# Patient Record
Sex: FEMALE | Race: WHITE
[De-identification: names, ages, dates, MRNs, and addresses within clinical notes are randomized per-mention and may not be internally consistent; named-entity substitution may affect disease eponyms.]

---

## 2020-05-28 ENCOUNTER — HOSPITAL ENCOUNTER (OUTPATIENT)
Dept: HOSPITAL 84 - D.CATH | Age: 70
Discharge: HOME | End: 2020-05-28
Attending: INTERNAL MEDICINE
Payer: MEDICARE

## 2020-05-28 VITALS — BODY MASS INDEX: 20.9 KG/M2 | WEIGHT: 130.07 LBS | HEIGHT: 66 IN

## 2020-05-28 VITALS — DIASTOLIC BLOOD PRESSURE: 69 MMHG | SYSTOLIC BLOOD PRESSURE: 156 MMHG

## 2020-05-28 DIAGNOSIS — R00.2: ICD-10-CM

## 2020-05-28 DIAGNOSIS — I25.119: Primary | ICD-10-CM

## 2020-05-28 LAB
ALT SERPL-CCNC: 20 U/L (ref 10–68)
ANION GAP SERPL CALC-SCNC: 9.4 MMOL/L (ref 8–16)
BASOPHILS NFR BLD AUTO: 0.7 % (ref 0–2)
BUN SERPL-MCNC: 12 MG/DL (ref 7–18)
CALCIUM SERPL-MCNC: 9.5 MG/DL (ref 8.5–10.1)
CHLORIDE SERPL-SCNC: 105 MMOL/L (ref 98–107)
CHOLEST/HDLC SERPL: 2.4 RATIO (ref 2.3–4.1)
CO2 SERPL-SCNC: 28.2 MMOL/L (ref 21–32)
CREAT SERPL-MCNC: 0.8 MG/DL (ref 0.6–1.3)
EOSINOPHIL NFR BLD: 2.2 % (ref 0–7)
ERYTHROCYTE [DISTWIDTH] IN BLOOD BY AUTOMATED COUNT: 12.5 % (ref 11.5–14.5)
GLUCOSE SERPL-MCNC: 105 MG/DL (ref 74–106)
HCT VFR BLD CALC: 39.6 % (ref 36–48)
HDLC SERPL-MCNC: 81 MG/DL (ref 32–96)
HGB BLD-MCNC: 13.1 G/DL (ref 12–16)
IMM GRANULOCYTES NFR BLD: 0.3 % (ref 0–5)
LDL-HDL RATIO: 1.1 RATIO (ref 1.5–3.5)
LDLC SERPL-MCNC: 87 MG/DL (ref 0–100)
LYMPHOCYTES NFR BLD AUTO: 30.9 % (ref 15–50)
MCH RBC QN AUTO: 32.3 PG (ref 26–34)
MCHC RBC AUTO-ENTMCNC: 33.1 G/DL (ref 31–37)
MCV RBC: 97.8 FL (ref 80–100)
MONOCYTES NFR BLD: 7.8 % (ref 2–11)
NEUTROPHILS NFR BLD AUTO: 58.1 % (ref 40–80)
OSMOLALITY SERPL CALC.SUM OF ELEC: 277 MOSM/KG (ref 275–300)
PLATELET # BLD: 265 10X3/UL (ref 130–400)
PMV BLD AUTO: 9.9 FL (ref 7.4–10.4)
POTASSIUM SERPL-SCNC: 3.6 MMOL/L (ref 3.5–5.1)
RBC # BLD AUTO: 4.05 10X6/UL (ref 4–5.4)
SODIUM SERPL-SCNC: 139 MMOL/L (ref 136–145)
TRIGL SERPL-MCNC: 117 MG/DL (ref 30–200)
WBC # BLD AUTO: 6.7 10X3/UL (ref 4.8–10.8)

## 2020-05-28 PROCEDURE — 93458 L HRT ARTERY/VENTRICLE ANGIO: CPT

## 2020-05-28 NOTE — NUR
R WRIST SITE C/D/I, NO S/S BLEEDING OR HEMATOMA. R ARM/HAND WARM,
PULSES PALP WITH BRISK CAP REFILL. VSS. ALARMS ON AND C/L IN REACH.

## 2020-05-28 NOTE — NUR
2cc OF AIR REMOVED FROM Z BAND. NO BLEEDING/HEMATOMA NOTED. CALL
LIGHT WITHIN REACH. VSS AT THIS TIME. FRIEND AT BEDSIDE.

## 2020-05-28 NOTE — NUR
R WRIST SITE C/D/I, NO S/S BLEEDING OR HEMATOMA. HAND WARM WITH PALP
PULSES.  VSS. PT DENIES PAIN OR NEEDS. FRIEND REMAINS AT BS.

## 2020-05-28 NOTE — NUR
R WRIST SITE C/D/I, NO S/S BLEEDING OR HEMATOMA. PULSES PALP. VSS.
PT GIVEN COFFEE PER REQUEST. ALARMS ON AND C/L IN REACH.

## 2020-05-28 NOTE — NUR
1cc OF AIR REMOVED FROM Z BAND. NO BLEEDING/HEMATOMA NOTED. CALL
LIGHT WITHIN REACH. FRIEND AT BEDSIDE. VSS.

## 2020-05-28 NOTE — HEMODYNAMI
PATIENT:WILFREDO FIORE                                   MEDICAL RECORD: [id number]
: [birth year]                                            LOCATION:D.CAT          
ACCT# [id number]                                       ADMISSION DATE: 20
 
 
 Generatedon:[id number]:[id number]
Patient name: WILFREDO FIORE Patient #: [id number] Visit #: [id number] SSN: YOB: 1950 Date of
study: 2020
Page: Of
Hemodynamic Procedure Report
****************************
Patient Data
Patient Demographics
Procedure consent was obtained
First Name: WILFREDO         Gender: Female
Last Name: ADEBAYO           : 1950
Patient #: [id number]       Age: 69 year(s)
Visit #: [id number]       Race: 
Accession #:
[id number]
Additional ID: [id number]
Contact details
Address: [address] [place] [address]   Phone: [phone]
State: [place]
City: [place]
Zip code: [address]
Admission
Admission Data
Admission Date: 2020   Admission Time: 11:09
Arrival Date: 2020     Arrival Time: 13:00
Admit Source: Other         Insurance Payor: Medicare
[facility] #: [id number]
Height (in.): 65.75         BSA: 1.66 (m2)
Height (cm.): 167           BMI: 21.16 (kg/m2)
Weight (lbs.): 130.07
Weight (kg.): 59
Lab Results
Lab Result Date: 2020  Lab Result Time: 0:00
Biochemistry
Name         Units    Result                Min      Max
BUN          mg/dl    12       --(-*--)--   7        18
Creatinine   mg/dl    0.8      --(-*--)--   0.6      1.3
eGFR         ml/min   75       *-(----)--   90       120
NONAFRICAN
CBC
Name         Units    Result                Min      Max
Hemoglobin   g/dl     13.1     -*(----)--   13.5     17.5
Procedure
Procedure Types
Cath Procedure
Diagnostic Procedure
C
[facility] w/Coronaries
Sedation Charges
Moderate Sedation up to 15 minutes
PCI Procedure
Coronary Stent
Coronary Stent Initial
 
Hemochron ACT Test
Procedure Description
Procedure Date
Procedure Date: 2020
Procedure Start Time: 13:11
Procedure End Time: 13:32
Procedure Staff
Name                            Function
Fermin Linda MD              Performing Physician
Rose Bowie RT                    Monitor
Na Kelly RT                Scrub
Mary Ellen Villeda RN                  Nurse
Indication
Angina
Procedure Data
Cath Procedure
Fluoroscopy
Diagnostic fluoroscopy      Total fluoroscopy Time: 3.2
time: 3.2 min               min
Diagnostic fluoroscopy      Total fluoroscopy dose: 249
dose: 249 mGy               mGy
Contrast Material
Contrast Material Type                       Amount (ml)
Isovue 300                                   75
Entry Location
Entry     Primary  Successful  Side  Size  Upsize Upsize Entry    Closure     Burns
ccessful  Closure
Location                             (Fr)  1 (Fr) 2 (Fr) Remarks  Device        
          Remarks
Radial                         Right 6 Fr                         Mechanical
artery                               Short                        Compression
Estimated blood loss: 5 ml
Diagnostic catheters
Device Type               Used For           End Catheter
Placement
DIAGNOSTIC [place] 110cm 5  Multi-vessel
Fr catheter (002693)      Angiography
Procedure Complications
No complications
Procedure Medications
Medication           Administration Route Dosage
Oxygen               etCO2 Nasal cannula  2 l/min
Lidocaine 2%         added to field       20
Heparin Flush Bag    added to field       2 bags
(1000units/500ml NS)
0.9% NaCl            I.V.                 100 ml/hr
Radial Cocktail      I.A.                 1 syringe
(Verapamil 2mg/Nitro
400mcg/Heparin
1500units)
Versed               I.V.                 2 mg
Fentanyl             I.V.                 50 mcg
Heparin Bolus        I.V.                 4000 units
Versed               I.V.                 1 mg
Fentanyl             I.V.                 50 mcg
Integrilin (Bolus    I.V.                 5 ml
2mg/ml)
Versed               I.V.                 1 mg
Plavix               P.O.                 600 mg
Hemodynamics
 
Rest
BSA: 1.66 (m2) HGB: 13.1 (g/dl) O2 Consumption: Estimated: 171.26 (ml/min) O2 Co
nsumption indexed:
Estimated:103.17 (ml/min/m) Heart Rate: 100 (bpm)
Pressure Samples
Time     Site     Value (mmHg) Purpose      Heart      Use
Rate(bpm)
13:15    LV       152/39,32    Snapshot     137
13:15    AO       152/135(136) Pullback     106
Gradients
Valve  Time  Site Site 2       Mean    SEP/DFP    Peak To Heart  Use
1                 (mmHg)  (sec/min)  Peak    Rate
(mmHg)  (bpm)
Aortic 13:15 LV   AO                                      106
152/135(136)
Snapshots
Pre Cath      Intra         NCS           Post Cath
Vital Signs
Time     Heart  Resp   SPO2 etCO2   NIBP (mmHg) Rhythm  Pain    Sedation
Rate   (ipm)  (%)  (mmHg)                      Status  Level
(bpm)
13:03:23 88     15     97   32.1    157/75(107) NSR     0 (11)  10(A)
, No
pain
13:07:50 96     16     98   35.1    148/69(107) NSR     0 (11)  10(A)
, No
pain
13:12:12 91     16     100  14.9    133/63(88)  NSR     0 (11)  9(A)
, No
pain
13:16:30 107    14     98   38.1    124/61(85)  NSR     0 (11)  9(A)
, No
pain
13:20:46 111    13     99   38.1    124/63(92)  NSR     0 (11)  9(A)
, No
pain
13:25:08 111    22     100  32.1    137/61(93)  NSR     0 (11)  9(A)
, No
pain
13:29:30 101    15     100  34.4    131/67(89)  NSR     0 (11)  10(A)
, No
pain
Medications
Time     Medication       Route   Dose    Verified Delivered Reason          Not
es    Effectiveness
by       by
13:03:12 Oxygen           etCO2   2 l/min Fermin Hyde    used for
Nasal           [facility] Villeda RN   procedure
cannula         MD
13:03:20 Lidocaine 2%     added   20ml    Fermin Christensen   used for
to      vial    [facility]   procedure
field MD MAYO
13:03:25 Heparin Flush    added   2 bags  Fermin Christensen   used for
Bag              to              [facility]   procedure
(1000units/500ml field MD MAYO
NS)
13:03:34 0.9% NaCl        I.V.    100     Fermin Hyde    Per physician
ml/hr   [facility] Villeda RN, MD
13:08:43 Versed           I.V.    2 mg    Fermin Carreroie    for sedation
 
[facility] Villeda RN, MD
13:08:50 Fentanyl         I.V.    50 mcg  Fermin Carreroie    for sedation
[facility] Villeda RN, MD
13:13:27 Radial Cocktail  I.A.    1       Fermin Christensen   for
(Verapamil               syringe [facility]   vasodilation
2mg/Nitro                        MD MAYO
400mcg/Heparin
1500units)
13:14:58 Versed           I.V.    1 mg    Fermin Carreroie    for sedation
[facility] Villeda RN, MD
13:15:01 Fentanyl         I.V.    50 mcg  Fermin Hyde    for sedation
[facility] Villeda RN, MD
13:19:41 Heparin Bolus    I.V.    4000    Fermin Hyde    for             annabelle
ified
units   [facility] Villeda RN   anticoagulation with dr MD peck
13:22:25 Integrilin       I.V.    5 ml    Fermin Hyde    for             was
xiomara 5
(Bolus 2mg/ml)                   [facility] Villeda RN   anticoagulation ml of
MD                                 vial
13:25:52 Versed           I.V.    1 mg    Fermin Hyde    for sedation
[facility] Villeda RN, MD
13:30:00 Plavix           P.O.    600 mg  Fermin Hyde    for
Maddi  Bell RN   antiplatelet
MD                 therapy
Procedure Log
Time     Note
12:30:04 Mary Ellen Villeda RN sent for patient. Start room use.
12:46:05 Diagnostic Cath Status : Elective
12:46:56 Indication : Angina
12:47:11 Time tracking: Regular hours (M-F 7:00 - 5:00)
12:47:15 Plan of Care:Hemodynamics will remain stable., Cardiac
rhythm will remain stable., Comfort level will be
maintained., Respiratory function will remain
adequate., Patient/ family verbilizes understanding of
procedure., Procedure tolerated without complication.,
Recovers from procedure without complications..
12:49:14 Lab Result : eGFR NONAFRICAN 75 ml/min
12:49:14 Lab Result : Hemoglobin 13.1 g/dl
12:49:14 Lab Result : BUN 12 mg/dl
12:49:14 Lab Result : Creatinine 0.8 mg/dl
12:51:01 Informed consent obtained and on chart
12:52:08 Admit Source: Other
12:52:10 Arrival Date: 2020 1:00:00 PM
12:52:36 Insurance Payor : Medicare
12:52:46 Patient Height : 65.75 inches
12:52:49 Patient Weight : 130.07 lbs
12:52:57 Patient received from Pre/Post Procedure Room to CCL 1
Alert and oriented. Tansferred to table in Supine
position.
12:52:58 Warm blankets applied, and spencer hugger turned on for
patient comfort.
12:52:59 Correct patient and procedure confirmed by team.
12:52:59 ECG and BP/O2 sat monitors applied to patient.
13:02:08 Vital chart was started
 
13:03:12 Oxygen 2 l/min etCO2 Nasal cannula was administered by
Mary Ellen Villeda RN; used for procedure; Verbal order read
back and verified.
13:03:20 Lidocaine 2% 20ml vial added to field was administered
by Fermin Linda MD; used for procedure; Verbal
order read back and verified.
13:03:25 Heparin Flush Bag (1000units/500ml NS) 2 bags added to
field was administered by Fermin Linda MD; used for
procedure; Verbal order read back and verified.
13:03:34 0.9% NaCl 100 ml/hr I.V. was administered by Mary Ellen Villeda RN; Per physician; Verbal order read back and
verified.
13:05:43 Baseline sample Acquired.
13:05:54 Rhythm: sinus tachycardia
13:05:56 Full Disclosure recording started
13:06:01 H&P Date Dictated: 2020 Within 30 days and on
chart., H&P Addendum completed by physician on day of
procedure. (MUST COMPLETE FOR ALL OUTPATIENTS).
13:06:02 Pre-procedure instructions explained to patient.
13:06:03 Pre-op teaching completed and patient verbalized
understanding.
13:06:05 Family in patients room.
13:06:06 Patient NPO since Midnight.
13:06:09 Is the patient allergic to Iodine/contrast media? No.
13:06:10 Was the patient premedicated? Yes
13:06:11 Is patient on blood thinner?No
13:06:12 Patient diabetic? No.
13:06:15 Previous problem with sedation/anesthesia? No ?
13:06:17 Snore? No
13:06:18 Sleep apnea? No
13:06:19 Deviated septum? No
13:06:20 Opens mouth fully? Yes
13:06:21 Sticks out tongue? Yes
13:06:23 Airway obstruction? No ?
13:06:29 Dentures? Yes CRACKED TOOTH
13:06:36 Pre procedure: right dorsailis pedis pulse 2+ Normal;
easily identifiable; not easily obliterated
13:06:39 Pre procedure: left dorsailis pedis pulse 2+ Normal;
easily identifiable; not easily obliterated
13:06:42 Patient pain scale 0/10 ?.
13:06:53 IV patent on arrival in left forearm with 0.9% NaCl at
[facility].
13:07:05 Lab results completed and on chart.
13:07:40 Stress Test: yes; N/A ?
13:07:45 Risk of Mortality: 0.1
13:07:48 Risk of blood transfusion: 0.8
13:07:52 Risk of DREA: 0.4
13:08:21 Right Radial & Right Groin area was prepped with
chlora-prep and draped in sterile fashion
13:08:22 Alarms reviewed by RKarina N.
13:08:22 Sharps counted by scrub and verified by R.N.
13:08:24 Physician arrived
13:08:24 --------ALL STOP TIME OUT------
13:08:25 Final Timeout: patient, procedure, and site verified
with staff and physician. All members of the team are
in agreement.
13:08:26 Right Radial & Right Groin site verified by team.
13:08:30 Fire Safety Assessment: A--An alcohol-based skin
anteseptic being used preoperatively., C--Open oxygen
or nitrous oxide is being used., D--An ESU, laser, or
 
fiber-optic light is being used.
13:08:34 Physical assessment completed. ASA score P 2 - A
patient with mild systemic disease as per Fermin Linda MD.
13:08:43 Versed 2 mg I.V. was administered by Mary Ellen Villeda RN;
for sedation; Verbal order read back and verified.
13:08:44 2) 60-89 Mildly reduced kidney function, and other
findings (as for stage 1) point to kidney disease.
13:08:50 Fentanyl 50 mcg I.V. was administered by Mary Ellen Villeda
RN; for sedation; Verbal order read back and verified.
13:08:53 Maximum allowable contrast dose (3.7 X eGFR X 0.75)203
ml.
13:08:59 Sedation plan: IV Moderate Sedation Medication:Versed,
Fentanyl
13:09:32 Use device set Radial Dx or PCI
13:09:33 ACIST Syringe (82558) opened to sterile field.
13:09:33 Medline Cath Pack ([id number]) opened to sterile field.
13:09:34 Bag Decanter () opened to sterile field.
13:09:34 ACIST Hand Control (50937) opened to sterile field.
13:09:34 ACIST Manifold (98431) opened to sterile field.
13:09:35 Tegaderm 4 x 4 (1626W) opened to sterile field.
13:09:35 MBrace Wrist Support ([id number]) opened to sterile
field.
13:09:38 SHEATH 6FR RAIN (6905256) opened to sterile field.
13:09:38 EMERALD Guide Wire (459-607) opened to sterile field.
13:11:52 Procedure started.
13:11:57 Local anesthetic to right radial artery with Lidocaine
2% by Fermin Linda MD.**INITIAL ACCESS ONLY**
13:12:06 A 6 Fr Short sheath was inserted into the Right Radial
artery
13:13:27 Radial Cocktail (Verapamil 2mg/Nitro 400mcg/Heparin
1500units) 1 syringe I.A. was administered by Fermin Linda MD; for vasodilation; Verbal order read back
and verified.
13:14:39 A DIAGNOSTIC Tiger 110cm 5 Fr catheter (880812) was
advanced over the wire and used for Multi-vessel
Angiography.
13:14:58 Versed 1 mg I.V. was administered by Mary Ellen Villeda RN;
for sedation; Verbal order read back and verified.
13:15:01 Fentanyl 50 mcg I.V. was administered by Mary Ellen Villeda
RN; for sedation; Verbal order read back and verified.
13:15:33 LV hemodynamics recorded.
13:15:35 LV gram done using OROZCO
13:15:53 EF : 55 %
13:16:01 LCA angiography performed.
13:16:08 Injector settings: Ml/sec: 3, Volume: 6,
13:16:26 **ACC** Pre-intervention LATOSHA Flow is 3.
13:16:27 Pre PCI Site: Native mLAD has 80% stenosis.
13:17:11 RCA angiography performed.
13:17:14 Injector settings: Ml/sec: 3, Volume: 6,
13:17:26 **ACC**Dominant side:Right
13:17:26 Catheter removed.
13:17:26 Proceeding to intervention.
13:17:52 INFLATOR Merit BasixCompak (VE4646) opened to sterile
field.
13:17:57 GUIDE 6FR XBLAD 3.5 catheter ([id number]) opened to
sterile field.
13:18:16 WHISPER 300cm guide wire ([id number]) opened to sterile
field.
13:19:40 6 Fr XBLAD 3.5 guide catheter was inserted over the
 
wire
13:19:41 Heparin Bolus 4000 units I.V. was administered by
Mary Ellen Villeda RN; for anticoagulation; verified with dr peck Verbal order read back and verified.
13:19:43 WHISPER wire advanced.
13:22:25 Integrilin (Bolus 2mg/ml) 5 ml I.V. was administered
by Mary Ellen Villeda RN; for anticoagulation; wasted 5 ml of
vial Verbal order read back and verified.
13:23:28 Wire advanced across lesion.
13:25:52 Versed 1 mg I.V. was administered by Mary Ellen Villeda RN;
for sedation; Verbal order read back and verified.
13::43 Place stent Inflation Number: 1 A PATRICK RX 3.0 x 15
stent ([id number]) was prepped and advanced across
the Mid LAD 80. The stent was deployed at 14 MAIRA for
0:10 (min:sec) 0.
13:26:58 Stent catheter was removed intact over wire.
13::59 Wire removed.
13::59 Guide catheter removed.
13:27:32 Sheath removed intact; hemostasis achieved with
Mechanical Compression to the Right Radial artery.
13:27:36 ZEPHYR REGULAR TR BAND (042268) opened to sterile
field.
13:27:39 Procedure ended.(Physican Out)
13:30:00 Plavix 600 mg P.O. was administered by Mary Ellen Villeda RN;
for antiplatelet therapy; Verbal order read back and
verified.
13:30:56 **ACC** Post-intervention LATOSHA Flow is 3.
13:31:07 Fluoroscopy time 03.20 minutes.
13:31:12 Fluoroscopy dose: 249 mGy
13:31:12 Flurop Dose total: 249
13:31:18 Dose Area Product 77460 mGy/cm.
13:31:35 Contrast amount:Isovue 300 75ml.
13:31:38 Maximum allowable dose exceeded? No.
13:31:39 Sharps counted by scrub and verified by R.N.
13:31:41 [place] band inflated with 12cc of air.
13:31:42 Insertion/operative site no bleeding no hematoma.
13:31:47 Post right radial artery:stable
13:31:49 Post Procedure Pulses reassessed and unchanged
13:31:51 Post procedure rhythm: unchanged.
13:31:54 Estimated blood loss: 5 ml
13:31:55 Post procedure instruction explained to
patient.Patient verbalizes understanding.
13:31:56 Patient needs reinforcement of post procedure
teaching.
13:32:34 Procedure type changed to Cath procedure, Diagnostic
procedure, [facility], [facility] w/Coronaries, Sedation Charges,
Moderate Sedation up to 15 minutes, PCI procedure,
Coronary Stent, Coronary Stent Initial, Hemochron ACT
Test
13:32:36 Procedure and supply charges have been captured,
reviewed, submitted and are correct.
13:32:41 Procedure Complication : No complications
13:32:43 Vital chart was stopped
13:32:44 [facility] Findings: MVD- PCI performed (see procedure note)
13:32:46 Operative report dictated upon procedure completion.
13:32:47 See physician's report for complete and final results.
13:32:49 Report given to Pre/Post Procedure Room.
13:32:52 Patient transfered to Pre/Post Procedure Room with
Stretcher.
13:32:54 Procedure ended.
 
13:32:54 Full Disclosure recording stopped
13:33:02 ACT drawn and resulted at out of range high seconds.
(normal therapeutic range 180-240 seconds).
13:33:21 **ACC-PCI Only** Patient was given prescriptions, or
instructed by Fermin Linda MD to start/continue the
following medications upon discharge: Plavix
13:33:22 End room use (Document Last)
Intervention Summary
Intervention Notes
Time     ActionType  Lesion and  Equipment Used Action#  Pressure  Duration
Attributes
13:26:43 Place stent Mid LAD     PATRICK RX 3.0 x  1        14        00:10
15 stent
([id number])
Device Usage
Item Name      Manufacture  Quantity  Catalog       Hospital Part     Current Mi
nimal Lot# /
Number        Charge   Number   Stock   Stock   Serial#
Code
ACIST Syringe  Acist        1         16962         186766   551531   513418  20
(02297)        Medical
Systems Inc
Medline Cath   Medline      1         [id number]     809042   52144    521197  5
Pack
([id number])
Bag Decanter   Microtek     1         S         368123   58849    578118  5
(S)        Medical Inc.
ACIST Hand     Acist        1         79601         915667   695443   884184  5
Control        Medical
(95529)        Systems Inc
ACIST Manifold Acist        1         64077         768972   129999   126768  5
(31404)        Medical
Systems Inc
Tegaderm 4 x 4 3M           1         1626W         580467   864470   309189  5
(1626W)
MBrace Wrist   Advanced     1         140-0250-00   682542   00251    044934  5
Support        Vascular
([id number])    Dynamics
SHEATH 6FR     Cardinal     1         7657345       527214   4508744  624526  5
RAIN (7870174) [facility]
EMERALD Guide  Cardinal     1         502-455       313971   144137   121527  5
Wire (502-455) [facility]
DIAGNOSTIC     Terumo       1                796364   422947   642640  5
Tiger 110cm 5
Fr catheter
(620051)
INFLATOR Merit Merit        1         JB1068        274690   747904   301776  15
BasixCompak    Medical
(HC3921)
GUIDE 6FR      Cardinal     1         81634646      534396   883061   448860  10
XBLAD 3.5      Health
catheter
(24049114)
WHISPER 300cm  Funes       1         [id number]     556044   738718   125951  5
guide wire     Vascular
([id number])
PATRICK RX 3.0 x  Medtronic    1         [id number]  582001   6889558  112379  5 
      [id number]
15 stent
([id number])
 
ZEPHYR REGULAR Cardinal     1         549062        691115   6745279  924551  5
[facility]        Conecta 2
(691652)
Signature Audit [employer]
Stage           Time        Signature      Unsigned
Intra-Procedure 2020   Rose Bowie
1:36:09 PM  RT(R)
Intra-Procedure 2020   Mary Ellen Villeda RN
1:36:33 PM
Intra-Procedure 2020   Fermin Saldana
1:36:49 PM  Ralph MAYO
 
 
 
 
 
 
 
 
 
 
 
 
 
 
 
 
 
 
 
 
 
 
 
 
 
 
 
 
 
 
 
 
 
 
 
 
 
 
 
 
 
 
 
 
[id number] [place] [address]

## 2020-05-28 NOTE — NUR
Z BAND REMOVED AND DSG APPLIED, ARM BOARD IN PLACE. ALL D/C
INSTRUCTIONS REVIEWED WITH PT AND FRIEND WHO IS STAYING WITH HER
TONIGHT.

## 2020-05-28 NOTE — NUR
PT REC'D TO ROOM 5 VIA STRETCHER FROM CATH LAB. FRIEND AT BS.
MONITORS ESTAB.  SEE RN ASSESSMENT.  ALARMS ON AND C/L IN REACH.

## 2020-05-28 NOTE — NUR
R WRIST SITE C/D/I, NO S/S BLEEDING. PIV D/C'D INTACT, DSG APPLIED.
PT UP TO GET DRESSED WITH ASSISTANCE, THEN TO BR INDEPENDENTLY.

## 2020-05-29 NOTE — OP
PATIENT NAME:  WILFREDO FIORE                            MEDICAL RECORD: [id number]
:[birth year]                                             LOCATION:D.CAT          
                                                         ADMISSION DATE:        
SURGEON:  MOR SIERRA MD          
 
 
DATE OF OPERATION:  2020
 
PROCEDURE:  Left heart catheterization, selective coronary angiography, right
radial approach.
 
CATHETERS:  .  Radial sheath, Tiger catheter.
 
The procedure was well tolerated.  The patient returned to the dawson.  Sheath
removed.  Adequate hemostasis and TR band was placed.
 
FINDINGS:  Left ventriculography in 30-degree OROZCO view:  Normal wall motion,
normal systolic function.
 
CORONARY ANATOMY:
LEFT MAIN:  Left main is free of disease.
LAD:  Has discrete 80% stenosis in its mid portion.
CIRCUMFLEX:  Free of disease.
RIGHT CORONARY ARTERY:  Dominant right, free of disease.
 
IMPRESSION:  Single-vessel disease.  Continued angina.
 
PLAN:  Intervention momentarily.
 
DESCRIPTION OF PROCEDURE:  Using indwelling sheath, XB LAD guiding catheter
provided excellent guide catheter support followed by 300 cm Whisper wire was
placed across the tightly occluded LAD down this portion of vessel.  Stent
deployed was a 3.0 x 15 mm Arjun drug-eluting stent up to a 14 atmospheres for 45
seconds.  Final angiography shows excellent resolution of 80% stenosis, no
significant residual.  LATOSHA flow was 3 throughout the procedure.  Heparin was
used during the case.  Plavix was loaded in the lab.  Sheath was closed with
ExoSeal device.
 
TRANSINT:[id number] Voice Confirmation ID: [id number] DOCUMENT ID: [id number]
                                           
                                           MOR SIERRA MD          
 
 
 
Electronically Signed by MOR SIERRA on 20 at 1212
 
 
 
 
 
CC:                                                             [id number]
DICTATION DATE: 20 1332     :     20 1719      DEP CLI 
                                                                      20
[facility]                                          
[address] [place] [address]